# Patient Record
Sex: MALE | Race: BLACK OR AFRICAN AMERICAN | Employment: OTHER | URBAN - METROPOLITAN AREA
[De-identification: names, ages, dates, MRNs, and addresses within clinical notes are randomized per-mention and may not be internally consistent; named-entity substitution may affect disease eponyms.]

---

## 2021-05-21 ENCOUNTER — HOSPITAL ENCOUNTER (EMERGENCY)
Age: 61
Discharge: ANOTHER HOSPITAL | End: 2021-05-21
Attending: EMERGENCY MEDICINE
Payer: OTHER GOVERNMENT

## 2021-05-21 ENCOUNTER — APPOINTMENT (OUTPATIENT)
Dept: GENERAL RADIOLOGY | Age: 61
End: 2021-05-21
Attending: EMERGENCY MEDICINE
Payer: OTHER GOVERNMENT

## 2021-05-21 VITALS
DIASTOLIC BLOOD PRESSURE: 89 MMHG | WEIGHT: 191 LBS | HEART RATE: 72 BPM | HEIGHT: 72 IN | OXYGEN SATURATION: 99 % | BODY MASS INDEX: 25.87 KG/M2 | SYSTOLIC BLOOD PRESSURE: 133 MMHG | RESPIRATION RATE: 18 BRPM | TEMPERATURE: 97.7 F

## 2021-05-21 DIAGNOSIS — I30.9 ACUTE MYOPERICARDITIS: Primary | ICD-10-CM

## 2021-05-21 LAB
ALBUMIN SERPL-MCNC: 3.6 G/DL (ref 3.4–5)
ALBUMIN/GLOB SERPL: 0.9 {RATIO} (ref 0.8–1.7)
ALP SERPL-CCNC: 50 U/L (ref 45–117)
ALT SERPL-CCNC: 31 U/L (ref 16–61)
ANION GAP SERPL CALC-SCNC: 6 MMOL/L (ref 3–18)
AST SERPL-CCNC: 25 U/L (ref 10–38)
BASOPHILS # BLD: 0 K/UL (ref 0–0.1)
BASOPHILS NFR BLD: 0 % (ref 0–2)
BILIRUB SERPL-MCNC: 1.1 MG/DL (ref 0.2–1)
BNP SERPL-MCNC: 52 PG/ML (ref 0–900)
BUN SERPL-MCNC: 14 MG/DL (ref 7–18)
BUN/CREAT SERPL: 15 (ref 12–20)
CALCIUM SERPL-MCNC: 8.3 MG/DL (ref 8.5–10.1)
CHLORIDE SERPL-SCNC: 101 MMOL/L (ref 100–111)
CO2 SERPL-SCNC: 34 MMOL/L (ref 21–32)
CREAT SERPL-MCNC: 0.96 MG/DL (ref 0.6–1.3)
CRP SERPL-MCNC: 2.1 MG/DL (ref 0–0.3)
DIFFERENTIAL METHOD BLD: ABNORMAL
EOSINOPHIL # BLD: 0.1 K/UL (ref 0–0.4)
EOSINOPHIL NFR BLD: 2 % (ref 0–5)
ERYTHROCYTE [DISTWIDTH] IN BLOOD BY AUTOMATED COUNT: 13.2 % (ref 11.6–14.5)
ERYTHROCYTE [SEDIMENTATION RATE] IN BLOOD: 3 MM/HR (ref 0–20)
GLOBULIN SER CALC-MCNC: 3.9 G/DL (ref 2–4)
GLUCOSE SERPL-MCNC: 92 MG/DL (ref 74–99)
HCT VFR BLD AUTO: 42 % (ref 36–48)
HGB BLD-MCNC: 14.3 G/DL (ref 13–16)
LYMPHOCYTES # BLD: 2.5 K/UL (ref 0.9–3.6)
LYMPHOCYTES NFR BLD: 29 % (ref 21–52)
MCH RBC QN AUTO: 28.8 PG (ref 24–34)
MCHC RBC AUTO-ENTMCNC: 34 G/DL (ref 31–37)
MCV RBC AUTO: 84.7 FL (ref 74–97)
MONOCYTES # BLD: 0.9 K/UL (ref 0.05–1.2)
MONOCYTES NFR BLD: 11 % (ref 3–10)
NEUTS SEG # BLD: 4.9 K/UL (ref 1.8–8)
NEUTS SEG NFR BLD: 58 % (ref 40–73)
PLATELET # BLD AUTO: 168 K/UL (ref 135–420)
PMV BLD AUTO: 10.5 FL (ref 9.2–11.8)
POTASSIUM SERPL-SCNC: 3.6 MMOL/L (ref 3.5–5.5)
PROT SERPL-MCNC: 7.5 G/DL (ref 6.4–8.2)
RBC # BLD AUTO: 4.96 M/UL (ref 4.35–5.65)
SODIUM SERPL-SCNC: 141 MMOL/L (ref 136–145)
TROPONIN I SERPL-MCNC: 0.39 NG/ML (ref 0–0.04)
WBC # BLD AUTO: 8.5 K/UL (ref 4.6–13.2)

## 2021-05-21 PROCEDURE — 86140 C-REACTIVE PROTEIN: CPT

## 2021-05-21 PROCEDURE — 85652 RBC SED RATE AUTOMATED: CPT

## 2021-05-21 PROCEDURE — 80053 COMPREHEN METABOLIC PANEL: CPT

## 2021-05-21 PROCEDURE — 93005 ELECTROCARDIOGRAM TRACING: CPT

## 2021-05-21 PROCEDURE — 83880 ASSAY OF NATRIURETIC PEPTIDE: CPT

## 2021-05-21 PROCEDURE — 99285 EMERGENCY DEPT VISIT HI MDM: CPT

## 2021-05-21 PROCEDURE — 85025 COMPLETE CBC W/AUTO DIFF WBC: CPT

## 2021-05-21 PROCEDURE — 71045 X-RAY EXAM CHEST 1 VIEW: CPT

## 2021-05-21 PROCEDURE — 74011250637 HC RX REV CODE- 250/637: Performed by: EMERGENCY MEDICINE

## 2021-05-21 PROCEDURE — 84484 ASSAY OF TROPONIN QUANT: CPT

## 2021-05-21 PROCEDURE — 86038 ANTINUCLEAR ANTIBODIES: CPT

## 2021-05-21 RX ORDER — COLCHICINE 0.6 MG/1
0.6 CAPSULE ORAL
Status: COMPLETED | OUTPATIENT
Start: 2021-05-21 | End: 2021-05-21

## 2021-05-21 RX ORDER — GUAIFENESIN 100 MG/5ML
324 LIQUID (ML) ORAL
Status: COMPLETED | OUTPATIENT
Start: 2021-05-21 | End: 2021-05-21

## 2021-05-21 RX ADMIN — COLCHICINE 0.6 MG: 0.6 CAPSULE ORAL at 03:07

## 2021-05-21 RX ADMIN — ASPIRIN 81 MG CHEWABLE TABLET 324 MG: 81 TABLET CHEWABLE at 03:07

## 2021-05-21 NOTE — ED PROVIDER NOTES
EMERGENCY DEPARTMENT HISTORY AND PHYSICAL EXAM      Date: 5/21/2021  Patient Name: Yessi Mackey III    History of Presenting Illness     Chief Complaint   Patient presents with    Chest Pain       History (Context): Toni Jc is a 56 y.o. gentleman with hypertension and no other significant past medical history who presents with 2 days of subacute onset, intermittent chest pain made worse with lying flat, which is substernal and radiating to the right chest.  The pain is intermittently burning versus grabbing. No associated symptoms. The patient states he performs spin class on the [employer] bike every day, and walks 2-1/2 miles yesterday without issue. The patient golfs daily. The patient has no chest pain with these exercises. On review of systems, the patient denies fever, chills, trauma, back pain, abdominal pain, nausea, vomiting, diaphoresis. PCP: None    Current Outpatient Medications   Medication Sig Dispense Refill    Hydrochlorothiazide (HYDRODIURIL) 12.5 mg tablet Take 12.5 mg by mouth daily.  aspirin (ASPIRIN) 325 mg tablet Take 325 mg by mouth daily.  simvastatin (ZOCOR) 10 mg tablet Take 10 mg by mouth nightly. Past History     Past Medical History:  Past Medical History:   Diagnosis Date    Hypertension     Respiratory abnormalities     sleep apnea     Stroke ([facility] [address] 2008       Past Surgical History:  No past surgical history on file. Family History:  Family History   Problem Relation Age of Onset    Hypertension Father     No Known Problems Mother        Social History:  Social History     Tobacco Use    Smoking status: Never Smoker   Substance Use Topics    Alcohol use: No    Drug use: No       Allergies:  No Known Allergies    PMH, PSH, family history, social history, allergies reviewed with the patient with significant items noted above. Review of Systems   As per HPI, otherwise reviewed and negative.      Physical Exam     Vitals:    05/21/21 0211 05/21/21 0215 05/21/21 0230 05/21/21 0430   BP: (!) 160/100 (!) 150/95 134/64 (!) 145/93   Pulse: 68 69 71 74   Resp: 16 18 18 19   Temp: 97.7 °F (36.5 °C)      SpO2: 100% 100%  92%   Weight: 86.6 kg (191 lb)      Height: 6' (1.829 m)          Gen: Well-appearing, in no acute distress   HEENT: Normocephalic, sclera anicteric  Cardiovascular: Normal rate, regular rhythm, no murmurs, rubs, gallops. Pulses intact and equal distally. Pulmonary: No respiratory distress. No stridor. Clear lungs. ABD: Soft, nontender, nondistended. Neuro: Alert. Normal speech. Normal mentation. Psych: Normal thought content and thought processes. : No CVA tenderness  EXT: no Edema. Moves all extremities well. No cyanosis or clubbing. Skin: Warm and well-perfused. Other:        Diagnostic Study Results     Labs -     Recent Results (from the past 12 hour(s))   EKG, 12 LEAD, INITIAL    Collection Time: 05/21/21  2:12 AM   Result Value Ref Range    Ventricular Rate 73 BPM    Atrial Rate 73 BPM    P-R Interval 188 ms    QRS Duration 98 ms    Q-T Interval 420 ms    QTC Calculation (Bezet) 462 ms    Calculated P Axis 50 degrees    Calculated R Axis 43 degrees    Calculated T Axis 38 degrees    Diagnosis       Normal sinus rhythm  Voltage criteria for left ventricular hypertrophy  Possible Acute pericarditis  Abnormal ECG  When compared with ECG of 07-OCT-2015 06:56,  ST more elevated in Anterior leads     CBC WITH AUTOMATED DIFF    Collection Time: 05/21/21  2:21 AM   Result Value Ref Range    WBC 8.5 4.6 - 13.2 K/uL    RBC 4.96 4.35 - 5.65 M/uL    HGB 14.3 13.0 - 16.0 g/dL    HCT 42.0 36.0 - 48.0 %    MCV 84.7 74.0 - 97.0 FL    MCH 28.8 24.0 - 34.0 PG    MCHC 34.0 31.0 - 37.0 g/dL    RDW 13.2 11.6 - 14.5 %    PLATELET 925 423 - 410 K/uL    MPV 10.5 9.2 - 11.8 FL    NEUTROPHILS 58 40 - 73 %    LYMPHOCYTES 29 21 - 52 %    MONOCYTES 11 (H) 3 - 10 %    EOSINOPHILS 2 0 - 5 %    BASOPHILS 0 0 - 2 %    ABS.  NEUTROPHILS 4.9 1.8 - 8.0 K/UL    ABS. LYMPHOCYTES 2.5 0.9 - 3.6 K/UL    ABS. MONOCYTES 0.9 0.05 - 1.2 K/UL    ABS. EOSINOPHILS 0.1 0.0 - 0.4 K/UL    ABS. BASOPHILS 0.0 0.0 - 0.1 K/UL    DF AUTOMATED     METABOLIC PANEL, COMPREHENSIVE    Collection Time: 05/21/21  2:21 AM   Result Value Ref Range    Sodium 141 136 - 145 mmol/L    Potassium 3.6 3.5 - 5.5 mmol/L    Chloride 101 100 - 111 mmol/L    CO2 34 (H) 21 - 32 mmol/L    Anion gap 6 3.0 - 18 mmol/L    Glucose 92 74 - 99 mg/dL    BUN 14 7.0 - 18 MG/DL    Creatinine 0.96 0.6 - 1.3 MG/DL    BUN/Creatinine ratio 15 12 - 20      GFR est AA >60 >60 ml/min/1.73m2    GFR est non-AA >60 >60 ml/min/1.73m2    Calcium 8.3 (L) 8.5 - 10.1 MG/DL    Bilirubin, total 1.1 (H) 0.2 - 1.0 MG/DL    ALT (SGPT) 31 16 - 61 U/L    AST (SGOT) 25 10 - 38 U/L    Alk. phosphatase 50 45 - 117 U/L    Protein, total 7.5 6.4 - 8.2 g/dL    Albumin 3.6 3.4 - 5.0 g/dL    Globulin 3.9 2.0 - 4.0 g/dL    A-G Ratio 0.9 0.8 - 1.7     TROPONIN I    Collection Time: 05/21/21  2:21 AM   Result Value Ref Range    Troponin-I, QT 0.39 (H) 0.0 - 0.045 NG/ML   NT-PRO BNP    Collection Time: 05/21/21  2:21 AM   Result Value Ref Range    NT pro-BNP 52 0 - 900 PG/ML   SED RATE (ESR)    Collection Time: 05/21/21  2:21 AM   Result Value Ref Range    Sed rate, automated 3 0 - 20 mm/hr       Radiologic Studies -   XR CHEST PORT    (Results Pending)     CT Results  (Last 48 hours)    None        CXR Results  (Last 48 hours)    None            Medical Decision Making   I am the first provider for this patient. I reviewed the vital signs, available nursing notes, past medical history, past surgical history, family history and social history. Vital Signs-Reviewed the patient's vital signs. EKG: Interpreted by myself. Diffuse OH depression, J-point elevation in mild sub-mm diffuse ST elevations. Records Reviewed: Personally, on initial evaluation    MDM:   Patient presents with substernal chest pain made worse with lying flat.   Exam significant for normal exam.   DDX considered: Pericarditis, mediastinitis, unstable angina, pneumothorax, GERD, MSK chest pain, anxiety  DDX thought to be less likely but also considered due to high risk condition: Aortic dissection, PE, Boerhaave's,s    Patient condition on initial evaluation: Stable    Plan:   Pain Control  Close Observation  Cardiac monitoring  As per orders noted below:  Orders Placed This Encounter    XR CHEST PORT    CBC WITH AUTOMATED DIFF    COMPREHENSIVE METABOLIC PANEL    TROPONIN I    PRO-BNP    SED RATE (ESR)    C REACTIVE PROTEIN, QT    YOLANDA BY IFA W/REFLEX    EKG, 12 LEAD, INITIAL    aspirin chewable tablet 324 mg    colchicine (MITIGARE) capsule 0.6 mg        HEART Score 4    Management  Scores 0-3: 0.9-1.7% risk of adverse cardiac event. In the HEART Score study, these patients were discharged (0.99% in the retrospective study, 1.7% in the prospective study)  Scores 4-6: 12-16.6% risk of adverse cardiac event. In the HEART Score study, these patients were admitted to the hospital. (11.6% retrospective, 16.6% prospective)  Scores ? 7: 50-65% risk of adverse cardiac event. In the HEART Score study, these patients were candidates for early invasive measures. (65.2% retrospective, 50.1% prospective)  A MACE (Major Adverse Cardiac Event) was defined as all-cause mortality, myocardial infarction, or coronary revascularization. Critical Actions  Do not use if new ST-segment elevation requiring immediate intervention or clinically unstable patients. A prospective validation of the HEART score for chest pain patients at the emergency department. Sonia Ramos, [address] [place] [address], ALYSSA Ibarra A. Mosterd, Bessy Cartwright R. Tio, R. Braam, et al.   Int J Cardiol. 2013 Oct 3; 168(3): 9396-6560. Published online 2013 Mar 7. doi: 10.1016/j.ijcard. 2013.01.255    ED Course:   EKG demonstrated findings suggestive of pericarditis.   In commendation with the patient's history, this seems most likely. I performed bedside ultrasonography, which demonstrated essentially normal ejection fraction, and no pericardial effusion but slightly thickened pericardium. Lab demonstrated increased troponin, suggesting myopericarditis. Patient will need admission. Holding off on heparin at this time. I contacted [facility], who will admit the patient. Patient condition at time of disposition: Stable    Procedures:  Procedures      Critical Care Time:       Diagnosis     Clinical Impression:   1. Acute myopericarditis        Signed,  Kate Montelongo MD  Emergency Physician  [facility]    As a voice dictation software was utilized to dictate this note, minor word transpositions can occur. I apologize for confusing wording and typographic errors. Please feel free to contact me for clarification.

## 2021-05-21 NOTE — ED TRIAGE NOTES
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 Pt states that it feels like a knot and it hurts when he lays down. Denies any cardiac hx.

## 2021-05-22 LAB
ATRIAL RATE: 73 BPM
CALCULATED P AXIS, ECG09: 50 DEGREES
CALCULATED R AXIS, ECG10: 43 DEGREES
CALCULATED T AXIS, ECG11: 38 DEGREES
DIAGNOSIS, 93000: NORMAL
P-R INTERVAL, ECG05: 188 MS
Q-T INTERVAL, ECG07: 420 MS
QRS DURATION, ECG06: 98 MS
QTC CALCULATION (BEZET), ECG08: 462 MS
VENTRICULAR RATE, ECG03: 73 BPM

## 2021-05-24 LAB
ANA TITR SER IF: NEGATIVE {TITER}
PLEASE NOTE, 734348: NORMAL